# Patient Record
(demographics unavailable — no encounter records)

---

## 2025-01-15 NOTE — ASSESSMENT
[FreeTextEntry1] : REYNA is a 9-year-old girl in the 4th grade, presented for evaluation of inattention and headaches. She attends a regular classroom without accommodations but struggles with focus and attention. Her frontal headaches, present for over 1 year, often occur in the mornings or at school, sometimes with phonophobia, nausea, and vomiting. Her pediatrician suspects migraines, treated with Tylenol and rest. There is a family history of migraines and ADHD. Her neurological exam was normal. A workup for ADHD, including [place] forms, is planned. The importance of diet, hydration, sleep, and trigger identification for headache management was discussed, along with preventative and abortive treatment options.

## 2025-01-15 NOTE — REASON FOR VISIT
[Initial Consultation] : an initial consultation for [ADHD] : ADHD [Headache] : headache [Time Spent: ____ minutes] : Total time spent using  services: [unfilled] minutes. The patient's primary language is not English thus required  services. [Patient] : patient [Mother] : mother [Interpreters_IDNumber] : [id number] [TWNoteComboBox1] : Ni[place]an

## 2025-01-15 NOTE — QUALITY MEASURES
[Impairment in more than one setting] : Impairment in more than one setting: Yes [Coexisting conditions] : Coexisting conditions: Yes [Medication choices] : Medication choices: Yes [Side effects of medications] : Side effects of medications: Yes [Classification of primary headache syndrome based on latest version of International Classification of  Headache Disorders was performed] : Classification of primary headache syndrome based on latest version of International Classification of Headache Disorders was performed: Yes [Overuse of OTC and prescribed analgesics assessed] : Overuse of OTC and prescribed analgesics assessed: Yes [Lifestyle factors including diet, exercise and sleep hygiene discussed] : Lifestyle factors including diet, exercise and sleep hygiene discussed: Yes [Referral to behavioral health for frequent headaches discussed] : Referral to behavioral health for frequent headaches discussed: Yes [Treatment plan for headache including  pharmacological (abortive and preventive) and nonpharmacological (nutraceutical and bio-behavioral) interventions] : Treatment plan for headache including  pharmacological (abortive and preventive) and nonpharmacological (nutraceutical and bio-behavioral) interventions: Yes [Functional disability based on clinical history and/or age appropriate disability scale assessed] : Functional disability based on clinical history and/or age appropriate disability scale assessed: Yes

## 2025-01-15 NOTE — PHYSICAL EXAM
[Well-appearing] : well-appearing [Normocephalic] : normocephalic [No dysmorphic facial features] : no dysmorphic facial features [No deformities] : no deformities [Alert] : alert [Well related, good eye contact] : well related, good eye contact [Conversant] : conversant [Normal speech and language] : normal speech and language [Follows instructions well] : follows instructions well [Full extraocular movements] : full extraocular movements [No nystagmus] : no nystagmus [Normal facial sensation to light touch] : normal facial sensation to light touch [No facial asymmetry or weakness] : no facial asymmetry or weakness [Gross hearing intact] : gross hearing intact [Equal palate elevation] : equal palate elevation [Good shoulder shrug] : good shoulder shrug [Normal tongue movement] : normal tongue movement [Midline tongue, no fasciculations] : midline tongue, no fasciculations [Normal axial and appendicular muscle tone] : normal axial and appendicular muscle tone [Gets up on table without difficulty] : gets up on table without difficulty [No pronator drift] : no pronator drift [Normal finger tapping and fine finger movements] : normal finger tapping and fine finger movements [No abnormal involuntary movements] : no abnormal involuntary movements [5/5 strength in proximal and distal muscles of arms and legs] : 5/5 strength in proximal and distal muscles of arms and legs [Walks and runs well] : walks and runs well [Able to walk on heels] : able to walk on heels [Able to walk on toes] : able to walk on toes [Knee jerks] : knee jerks [No dysmetria on FTNT] : no dysmetria on FTNT [Good walking balance] : good walking balance [Normal gait] : normal gait [Able to tandem well] : able to tandem well [Negative Romberg] : negative Romberg [R handed] : R handed [de-identified] : No respiratory distress noted.

## 2025-01-15 NOTE — PLAN
[FreeTextEntry1] : [] [place] questionnaires given for parent and teacher [] Discussed use of Omega 3 fish oil [] MR brain [] Attention to hydration, avoidance of fasting and sleep hygiene. [] Start magnesium 200mg nightly and Vitamin B2 (Riboflavin) 200mg nightly [] Headache diary to keep track of headache frequency.  Lifestyle Goals: Regular sleep/waking times (on both weekdays and weekends) - Children 3-4yo:10-13 hrs; 6-11yo: 9-12 hrs; teens 13+: 8-10 hrs Regular exercise - 30 mins a day, 5 days a week Regular meals (protein rich breakfast within 30 min of waking and no skipping meals) Stay hydrated (1 ounce/kg body weight, 8-10 cups of water per day for teens) Can refer to www.headachereliefguide.com for more information on healthy habits. [id number]

## 2025-01-15 NOTE — CONSULT LETTER
[Dear  ___] : Dear  [unfilled], [Consult Letter:] : I had the pleasure of evaluating your patient, [unfilled]. [Please see my note below.] : Please see my note below. [Consult Closing:] : Thank you very much for allowing me to participate in the care of this patient.  If you have any questions, please do not hesitate to contact me. [Sincerely,] : Sincerely, [FreeTextEntry3] : TOMER Whaley Certified Pediatric Nurse Practitioner  Pediatric Neurology  [facility]

## 2025-01-15 NOTE — HISTORY OF PRESENT ILLNESS
[FreeTextEntry1] : HODA is a 9 year old female here for initial evaluation of inattention and headaches.    Early development: HODA was born full-term via normal vaginal delivery in [facility] and achieved all early developmental milestones. She moved to the  at age 3 and attended regular  at age 4.   Educational assessment: Current rdGrdrrdarddrderd:rd rd3rd Current District: [facility] ED/Any Accommodations/ICT in place: Currently in a regular classroom without accommodations and is struggling academically, particularly with focus and attention. While sitting in the front of the classroom has helped somewhat, she still reports difficulty focusing. Her teacher has expressed concerns about her academic performance, recommending she read more, and noting struggles with math and distractibility. Previous teachers have reported similar concerns. HODA also experiences headaches, primarily attributed to school-related stress, which have sometimes required her to be picked up from school.   At home, HODA lives with her parents and 12-year-old brother. She frequently displays inattentive behavior, often not responding when her mother calls her. Homework requires constant supervision, as HODA becomes easily distracted if her mother isn't directly beside her. She needs frequent redirection and prompting to stay on task. Mealtimes are characterized by restlessness and fidgeting, with Hoda unable to sit still and frequently getting up. Her mother describes her as very active, noting that calmness or stillness is often a sign that HODA isn't feeling well. She is almost always in motion.   Social Concerns: -  Sleep: sleeps well, with a bedtime of 9:00-9:30 PM and quick sleep onset. + restless sleep and she occasional snoring. She denies daytime fatigue and wakes between 7:40 and 8:00 AM.  Eating: eats a varied diet. Drinkys plenty of water.  c/o HA, typically in the morning after waking or around 10:00-11:00 AM during school hours. Had 1 nighttime headache. The pain is located to the front of her head, + phonophobia during episodes. Denies other associated symptoms. Mother reports HA have been present for 1-2 years, and in the past year, she has been picked up from school approximately 4X due to them. Her most recent headache, 2 months ago, was accompanied by nausea and vomiting. There is a family history of migraines (maternal aunt). Her PMD suspects migraines and recommended Tylenol and rest, which provided relief.   Co- morbidities: No concern for anxiety, depression, OCD Other health concerns: Denies staring, twitching, seizure or seizure-like activity.   FAMILY HISTORY: Family history of ADHD: + cousin Family history of anxiety or depression: denies +family history of cardiac conditions - maternal grandmother (47yo)

## 2025-01-15 NOTE — BIRTH HISTORY
[At ___ Weeks Gestation] : at [unfilled] weeks gestation [Other: ________] : in [unfilled] [Normal Vaginal Route] : by normal vaginal route [None] : there were no delivery complications [Age Appropriate] : age appropriate developmental milestones met

## 2025-02-20 NOTE — HISTORY OF PRESENT ILLNESS
[FreeTextEntry1] : HODA is a 9 year old female here for f/u evaluation of inattention and headaches.  Currently in 4th grade, regular setting w/o accommodations. Struggling academically.   f/u 02/20/2025 [place] forms reviewed:   Parents responses:  +Inattention 8/9- (6/9). + Hyperactivity 6/9 (6/9)  +ODD: 4/8. (4/8) 19-26  ~Conduct disorder:2/14 (3/14) 27-40  Anxiety/ Depression: 0/7 (3/7) Overall performance: struggles w/ relationship w/ parents, relationship w./ siblings and relationship w/ peers.    Teachers responses: Annette Clayton ~ Inattention 5/9- (6/9).  Hyperactivity 0/9 (6/9)  ODD/ Conduct:0 /10. (3/10) 19-28  Anxiety/ Depression:  0/7 (3/7)  Overall Performance: struggles w/ math, written expression and assignment completion.  Struggles w/ comprehension and retention of information taught.   Teachers responses: IRIS ~ Inattention 5/9- (6/9).  Hyperactivity 0/9 (6/9)  ODD/ Conduct:0 /10. (3/10) 19-28  Anxiety/ Depression:  0/7 (3/7)  Overall Performance: struggles w/ reading,written expression and assignment completion and organizational skills. Comments: Not making academic progress.   MRI X 02/03/2025 wnl.  HODA has had 2 days of HA accompanied by vomiting last week.  Headaches have been consistent, occurring approximately every 2-3 weeks, totaling three episodes since the last visit. Preventative treatment has not been initiated, but Tylenol provides some relief, and warm compresses are also used.  Initial Visit: 01/15/2025 Early development: HODA was born full-term via [facility] and achieved all early developmental milestones. She [place] to the [place] at age 3 and attended regular  at age 4.   Educational assessment: Current rdGrdrrdarddrderd:rd rd3rd Current District: [facility]/Any Accommodations/ICT in place: Currently in a regular classroom without accommodations and is struggling academically, particularly with focus and attention. While sitting in the front of the classroom has helped somewhat, she still reports difficulty focusing. Her teacher has expressed concerns about her academic performance, recommending she read more, and noting struggles with math and distractibility. Previous teachers have reported similar concerns. HODA also experiences headaches, primarily attributed to school-related stress, which have sometimes required her to be picked up from school.   At home, HODA lives with her parents and 12-year-old brother. She frequently displays inattentive behavior, often not responding when her mother calls her. Homework requires constant supervision, as HODA becomes easily distracted if her mother isn't directly beside her. She needs frequent redirection and prompting to stay on task. Mealtimes are characterized by restlessness and fidgeting, with Hoda unable to sit still and frequently getting up. Her mother describes her as very active, noting that calmness or stillness is often a sign that HODA isn't feeling well. She is almost always in motion.   Social Concerns: -  Sleep: sleeps well, with a bedtime of 9:00-9:30 PM and quick sleep onset. + restless sleep and she occasional snoring. She denies daytime fatigue and wakes between 7:40 and 8:00 AM.  Eating: eats a varied diet. Drinkys plenty of water.  c/o HA, typically in the morning after waking or around 10:00-11:00 AM during school hours. Had 1 nighttime headache. The pain is located to the front of her head, + phonophobia during episodes. Denies other associated symptoms. Mother reports HA have been present for 1-2 years, and in the past year, she has been picked up from school approximately 4X due to them. Her most recent headache, 2 months ago, was accompanied by nausea and vomiting. There is a family history of migraines (maternal aunt). Her PMD suspects migraines and recommended Tylenol and rest, which provided relief.   Co- morbidities: No concern for anxiety, depression, OCD Other health concerns: Denies staring, twitching, seizure or seizure-like activity.   FAMILY HISTORY: Family history of ADHD: + cousin Family history of anxiety or depression: denies +family history of cardiac conditions - maternal grandmother (49yo)

## 2025-02-20 NOTE — PHYSICAL EXAM
[Well-appearing] : well-appearing [Normocephalic] : normocephalic [No dysmorphic facial features] : no dysmorphic facial features [No deformities] : no deformities [Alert] : alert [Well related, good eye contact] : well related, good eye contact [Conversant] : conversant [Normal speech and language] : normal speech and language [Follows instructions well] : follows instructions well [Full extraocular movements] : full extraocular movements [No nystagmus] : no nystagmus [Normal facial sensation to light touch] : normal facial sensation to light touch [No facial asymmetry or weakness] : no facial asymmetry or weakness [Gross hearing intact] : gross hearing intact [Equal palate elevation] : equal palate elevation [Good shoulder shrug] : good shoulder shrug [Normal tongue movement] : normal tongue movement [Midline tongue, no fasciculations] : midline tongue, no fasciculations [R handed] : R handed [Normal axial and appendicular muscle tone] : normal axial and appendicular muscle tone [Gets up on table without difficulty] : gets up on table without difficulty [No pronator drift] : no pronator drift [Normal finger tapping and fine finger movements] : normal finger tapping and fine finger movements [No abnormal involuntary movements] : no abnormal involuntary movements [5/5 strength in proximal and distal muscles of arms and legs] : 5/5 strength in proximal and distal muscles of arms and legs [Walks and runs well] : walks and runs well [Able to walk on heels] : able to walk on heels [Able to walk on toes] : able to walk on toes [Knee jerks] : knee jerks [No dysmetria on FTNT] : no dysmetria on FTNT [Good walking balance] : good walking balance [Normal gait] : normal gait [Able to tandem well] : able to tandem well [Negative Romberg] : negative Romberg [de-identified] : No respiratory distress noted.

## 2025-02-20 NOTE — ASSESSMENT
[FreeTextEntry1] : REYNA is a 9-year-old girl, presented for follow-up for inattention and headaches. She is currently in a regular 4th-grade classroom without accommodations. 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 Parents also expressed concerns about hyperactivity and ODD/conduct behaviors. REYNA experiences recurrent headaches, likely migraines, every 2-3 weeks, often accompanied by vomiting. The importance of diet, hydration, sleep, and trigger identification for headache management was discussed, along with preventative and abortive treatment options. Non focal exam.

## 2025-02-20 NOTE — REASON FOR VISIT
[Follow-Up Evaluation] : a follow-up evaluation for [ADHD] : ADHD [Headache] : headache [Patient] : patient [Mother] : mother [Medical Records] : medical records [Time Spent: ____ minutes] : Total time spent using  services: [unfilled] minutes. The patient's primary language is not English thus required  services. [Interpreters_IDNumber] : [id number] [TWNoteComboBox1] : [place]

## 2025-02-20 NOTE — DATA REVIEWED
[FreeTextEntry1] : mri x 02/2025 PROCEDURE DATE:  02/02/2025    INTERPRETATION:  History: Migraine headaches. Acute headaches. G43.909.  Description: A noncontrast brain MRI was performed.  Comparison: No prior brain imaging study was available for comparison at this Medical Center.  Sagittal T1, coronal T2, axial T1, T2, FLAIR, SWI, and diffusion-weighted series with ADC maps were performed.  There is no evidence for acute infarct, acute hemorrhage, mass, or hydrocephalus. There is no evidence for Chiari I malformation. The corpus callosum is fully formed. The pituitary gland appears grossly unremarkable for age.  The major intracranial arterial and dural venous sinus flow voids appear grossly preserved.  Trace mucosal thickening involves the paranasal sinuses without associated air-fluid levels.  The mastoid air cells and middle ear cavities are clear.  IMPRESSION:  No evidence of intracranial mass, infarct, hemorrhage, or hydrocephalus.

## 2025-02-20 NOTE — CONSULT LETTER
[Dear  ___] : Dear  [unfilled], [Consult Letter:] : I had the pleasure of evaluating your patient, [unfilled]. [Please see my note below.] : Please see my note below. [Consult Closing:] : Thank you very much for allowing me to participate in the care of this patient.  If you have any questions, please do not hesitate to contact me. [Sincerely,] : Sincerely, [FreeTextEntry3] : TOMER Whaley Certified Pediatric Nurse Practitioner  Pediatric Neurology  [facility]

## 2025-02-20 NOTE — PHYSICAL EXAM
[Well-appearing] : well-appearing [Normocephalic] : normocephalic [No dysmorphic facial features] : no dysmorphic facial features [No deformities] : no deformities [Alert] : alert [Well related, good eye contact] : well related, good eye contact [Conversant] : conversant [Normal speech and language] : normal speech and language [Follows instructions well] : follows instructions well [Full extraocular movements] : full extraocular movements [No nystagmus] : no nystagmus [Normal facial sensation to light touch] : normal facial sensation to light touch [No facial asymmetry or weakness] : no facial asymmetry or weakness [Gross hearing intact] : gross hearing intact [Equal palate elevation] : equal palate elevation [Good shoulder shrug] : good shoulder shrug [Normal tongue movement] : normal tongue movement [Midline tongue, no fasciculations] : midline tongue, no fasciculations [R handed] : R handed [Normal axial and appendicular muscle tone] : normal axial and appendicular muscle tone [Gets up on table without difficulty] : gets up on table without difficulty [No pronator drift] : no pronator drift [Normal finger tapping and fine finger movements] : normal finger tapping and fine finger movements [No abnormal involuntary movements] : no abnormal involuntary movements [5/5 strength in proximal and distal muscles of arms and legs] : 5/5 strength in proximal and distal muscles of arms and legs [Walks and runs well] : walks and runs well [Able to walk on heels] : able to walk on heels [Able to walk on toes] : able to walk on toes [Knee jerks] : knee jerks [No dysmetria on FTNT] : no dysmetria on FTNT [Good walking balance] : good walking balance [Normal gait] : normal gait [Able to tandem well] : able to tandem well [Negative Romberg] : negative Romberg [de-identified] : No respiratory distress noted.

## 2025-02-20 NOTE — CONSULT LETTER
[Dear  ___] : Dear  [unfilled], [Consult Letter:] : I had the pleasure of evaluating your patient, [unfilled]. [Please see my note below.] : Please see my note below. [Consult Closing:] : Thank you very much for allowing me to participate in the care of this patient.  If you have any questions, please do not hesitate to contact me. [Sincerely,] : Sincerely, [FreeTextEntry3] : TOMER Whaley Certified Pediatric Nurse Practitioner  Pediatric Neurology  [facility]

## 2025-02-20 NOTE — QUALITY MEASURES
[Classification of primary headache syndrome based on latest version of International Classification of  Headache Disorders was performed] : Classification of primary headache syndrome based on latest version of International Classification of Headache Disorders was performed: Yes [Overuse of OTC and prescribed analgesics assessed] : Overuse of OTC and prescribed analgesics assessed: Yes [Lifestyle factors including diet, exercise and sleep hygiene discussed] : Lifestyle factors including diet, exercise and sleep hygiene discussed: Yes [Referral to behavioral health for frequent headaches discussed] : Referral to behavioral health for frequent headaches discussed: Yes [Treatment plan for headache including  pharmacological (abortive and preventive) and nonpharmacological (nutraceutical and bio-behavioral) interventions] : Treatment plan for headache including  pharmacological (abortive and preventive) and nonpharmacological (nutraceutical and bio-behavioral) interventions: Yes [Impairment in more than one setting] : Impairment in more than one setting: Yes [Coexisting conditions] : Coexisting conditions: Yes [Medication choices] : Medication choices: Yes [Side effects of medications] : Side effects of medications: Yes [Functional disability based on clinical history and/or age appropriate disability scale assessed] : Functional disability based on clinical history and/or age appropriate disability scale assessed: Yes

## 2025-02-20 NOTE — HISTORY OF PRESENT ILLNESS
[FreeTextEntry1] : HODA is a 9 year old female here for f/u evaluation of inattention and headaches.  Currently in 4th grade, regular setting w/o accommodations. Struggling academically.   f/u 02/20/2025 [place] forms reviewed:   Parents responses:  +Inattention 8/9- (6/9). + Hyperactivity 6/9 (6/9)  +ODD: 4/8. (4/8) 19-26  ~Conduct disorder:2/14 (3/14) 27-40  Anxiety/ Depression: 0/7 (3/7) Overall performance: struggles w/ relationship w/ parents, relationship w./ siblings and relationship w/ peers.    Teachers responses: Annette Clayton ~ Inattention 5/9- (6/9).  Hyperactivity 0/9 (6/9)  ODD/ Conduct:0 /10. (3/10) 19-28  Anxiety/ Depression:  0/7 (3/7)  Overall Performance: struggles w/ math, written expression and assignment completion.  Struggles w/ comprehension and retention of information taught.   Teachers responses: IRIS ~ Inattention 5/9- (6/9).  Hyperactivity 0/9 (6/9)  ODD/ Conduct:0 /10. (3/10) 19-28  Anxiety/ Depression:  0/7 (3/7)  Overall Performance: struggles w/ reading,written expression and assignment completion and organizational skills. Comments: Not making academic progress.   MRI X 02/03/2025 wnl.  HODA has had 2 days of HA accompanied by vomiting last week.  Headaches have been consistent, occurring approximately every 2-3 weeks, totaling three episodes since the last visit. Preventative treatment has not been initiated, but Tylenol provides some relief, and warm compresses are also used.  Initial Visit: 01/15/2025 Early development: HODA was [facility] and achieved all early developmental milestones. She [place] the [place] age 3 and attended regular  at age 4.   Educational assessment: Current rdGrdrrdarddrderd:rd rd3rd Current District: [facility]/Any Accommodations/ICT in place: Currently in a regular classroom without accommodations and is struggling academically, particularly with focus and attention. While sitting in the front of the classroom has helped somewhat, she still reports difficulty focusing. Her teacher has expressed concerns about her academic performance, recommending she read more, and noting struggles with math and distractibility. Previous teachers have reported similar concerns. HODA also experiences headaches, primarily attributed to school-related stress, which have sometimes required her to be picked up from school.   At home, HODA lives with her parents and 12-year-old brother. She frequently displays inattentive behavior, often not responding when her mother calls her. Homework requires constant supervision, as HODA becomes easily distracted if her mother isn't directly beside her. She needs frequent redirection and prompting to stay on task. Mealtimes are characterized by restlessness and fidgeting, with Hoda unable to sit still and frequently getting up. Her mother describes her as very active, noting that calmness or stillness is often a sign that HODA isn't feeling well. She is almost always in motion.   Social Concerns: -  Sleep: sleeps well, with a bedtime of 9:00-9:30 PM and quick sleep onset. + restless sleep and she occasional snoring. She denies daytime fatigue and wakes between 7:40 and 8:00 AM.  Eating: eats a varied diet. Drinkys plenty of water.  c/o HA, typically in the morning after waking or around 10:00-11:00 AM during school hours. Had 1 nighttime headache. The pain is located to the front of her head, + phonophobia during episodes. Denies other associated symptoms. Mother reports HA have been present for 1-2 years, and in the past year, she has been picked up from school approximately 4X due to them. Her most recent headache, 2 months ago, was accompanied by nausea and vomiting. There is a family history of migraines (maternal aunt). Her PMD suspects migraines and recommended Tylenol and rest, which provided relief.   Co- morbidities: No concern for anxiety, depression, OCD Other health concerns: Denies staring, twitching, seizure or seizure-like activity.   FAMILY HISTORY: Family history of ADHD: + cousin Family history of anxiety or depression: denies +family history of cardiac conditions - maternal grandmother (47yo)

## 2025-02-20 NOTE — REASON FOR VISIT
[Follow-Up Evaluation] : a follow-up evaluation for [ADHD] : ADHD [Headache] : headache [Patient] : patient [Mother] : mother [Medical Records] : medical records [Time Spent: ____ minutes] : Total time spent using  services: [unfilled] minutes. The patient's primary language is not English thus required  services. [Interpreters_IDNumber] : [id number] [TWNoteComboBox1] : Citizen of [place]

## 2025-02-20 NOTE — PLAN
[FreeTextEntry1] : Migraines [] Attention to hydration, avoidance of fasting and sleep hygiene. [] Start magnesium 200mg nightly and Vitamin B2 (Riboflavin) 200mg nightly [] Headache diary to keep track of headache frequency. ADHD [] Letter for accommodations provided to parent. [] Discussed use of medications as well as side effects if accommodations do not improve school performance [] Discussed potential benefits of behavioral therapy  [] Follow up 3 months. Call sooner if any concerns.    Lifestyle Goals: Regular sleep/waking times (on both weekdays and weekends) - Children 3-4yo:10-13 hrs; 6-11yo: 9-12 hrs; teens 13+: 8-10 hrs Regular exercise - 30 mins a day, 5 days a week Regular meals (protein rich breakfast within 30 min of waking and no skipping meals) Stay hydrated (1 ounce/kg body weight, 8-10 cups of water per day for teens) Can refer to www.headachereliefguide.com for more information on healthy habits. [id number]

## 2025-02-20 NOTE — ASSESSMENT
[FreeTextEntry1] : REYNA is a 9-year-old girl, presented for follow-up for inattention and headaches. She is currently in a regular 4th-grade classroom without accommodations. Raiza forms indicate ADHD, predominantly inattentive type. Parents also expressed concerns about hyperactivity and ODD/conduct behaviors. REYNA experiences recurrent headaches, likely migraines, every 2-3 weeks, often accompanied by vomiting. The importance of diet, hydration, sleep, and trigger identification for headache management was discussed, along with preventative and abortive treatment options. Non focal exam.

## 2025-02-20 NOTE — PLAN
[FreeTextEntry1] : Migraines [] Attention to hydration, avoidance of fasting and sleep hygiene. [] Start magnesium 200mg nightly and Vitamin B2 (Riboflavin) 200mg nightly [] Headache diary to keep track of headache frequency. ADHD [] Letter for accommodations provided to parent. [] Discussed use of medications as well as side effects if accommodations do not improve school performance [] Discussed potential benefits of behavioral therapy  [] Follow up 3 months. Call sooner if any concerns.    Lifestyle Goals: Regular sleep/waking times (on both weekdays and weekends) - Children 3-6yo:10-13 hrs; 6-13yo: 9-12 hrs; teens 13+: 8-10 hrs Regular exercise - 30 mins a day, 5 days a week Regular meals (protein rich breakfast within 30 min of waking and no skipping meals) Stay hydrated (1 ounce/kg body weight, 8-10 cups of water per day for teens) Can refer to www.headachereliefguide.com for more information on healthy habits. [id number]